# Patient Record
Sex: MALE | Race: BLACK OR AFRICAN AMERICAN | NOT HISPANIC OR LATINO | URBAN - METROPOLITAN AREA
[De-identification: names, ages, dates, MRNs, and addresses within clinical notes are randomized per-mention and may not be internally consistent; named-entity substitution may affect disease eponyms.]

---

## 2022-06-01 ENCOUNTER — EMERGENCY (EMERGENCY)
Facility: HOSPITAL | Age: 32
LOS: 0 days | Discharge: HOME | End: 2022-06-01
Attending: STUDENT IN AN ORGANIZED HEALTH CARE EDUCATION/TRAINING PROGRAM
Payer: MEDICAID

## 2022-06-01 VITALS
HEIGHT: 70 IN | OXYGEN SATURATION: 98 % | SYSTOLIC BLOOD PRESSURE: 125 MMHG | DIASTOLIC BLOOD PRESSURE: 80 MMHG | WEIGHT: 145.06 LBS | TEMPERATURE: 98 F | RESPIRATION RATE: 19 BRPM | HEART RATE: 74 BPM

## 2022-06-01 VITALS
OXYGEN SATURATION: 98 % | TEMPERATURE: 98 F | RESPIRATION RATE: 18 BRPM | SYSTOLIC BLOOD PRESSURE: 120 MMHG | DIASTOLIC BLOOD PRESSURE: 84 MMHG | HEART RATE: 80 BPM

## 2022-06-01 DIAGNOSIS — K64.8 OTHER HEMORRHOIDS: ICD-10-CM

## 2022-06-01 DIAGNOSIS — K92.1 MELENA: ICD-10-CM

## 2022-06-01 PROCEDURE — 99282 EMERGENCY DEPT VISIT SF MDM: CPT

## 2022-06-01 NOTE — ED PROVIDER NOTE - CLINICAL SUMMARY MEDICAL DECISION MAKING FREE TEXT BOX
33 yo M presenting with rectal bleed, abd benign, no external hemorrhoids/fissures/thrombosed hemorrhoids, dc with colorectal follow up and return recs

## 2022-06-01 NOTE — ED ADULT NURSE NOTE - OBJECTIVE STATEMENT
patient is A&Ox4. Breathing unlabored on RA. Girlfriend at bedside. Patient complaining of seeing blood in toilet bowl and wiping since Monday. Denies any SOB, abdominal pain, n/v/d, constipation, dysuria, fever, chills, history of hemorrhoids or fissures. denies any other symptoms at this time. denies any PMH. patient is A&Ox4. Breathing unlabored on RA. Girlfriend at bedside. Patient complaining of seeing blood in toilet bowl and wiping since Monday. States he works out in the gym occasionally. Denies any SOB, abdominal pain, n/v/d, constipation, dysuria, fever, chills, history of hemorrhoids or fissures. denies any pain. denies any other symptoms at this time. denies any PMH.

## 2022-06-01 NOTE — ED PROVIDER NOTE - PATIENT PORTAL LINK FT
You can access the FollowMyHealth Patient Portal offered by [facility] by registering at the following website: [email or web address]. By joining [employer]’s FollowMyHealth portal, you will also be able to view your health information using other applications (apps) compatible with our system.

## 2022-06-01 NOTE — ED PROVIDER NOTE - NSFOLLOWUPINSTRUCTIONS_ED_ALL_ED_FT
Hemorrhoids       Hemorrhoids are swollen veins that may develop:  •In the butt (rectum). These are called internal hemorrhoids.      •Around the opening of the butt (anus). These are called external hemorrhoids.      Hemorrhoids can cause pain, itching, or bleeding. Most of the time, they do not cause serious problems. They usually get better with diet changes, lifestyle changes, and other home treatments.      What are the causes?    This condition may be caused by:  •Having trouble pooping (constipation).      •Pushing hard (straining) to poop.      •Watery poop (diarrhea).      •Pregnancy.       •Being very overweight (obese).      •Sitting for long periods of time.       •Heavy lifting or other activity that causes you to strain.       •Anal sex.       •Riding a bike for a long period of time.        What are the signs or symptoms?    Symptoms of this condition include:  •Pain.       •Itching or soreness in the butt.      •Bleeding from the butt.      •Leaking poop.      •Swelling in the area.      •One or more lumps around the opening of your butt.        How is this diagnosed?    A doctor can often diagnose this condition by looking at the affected area. The doctor may also:  •Do an exam that involves feeling the area with a gloved hand (digital rectal exam).      •Examine the area inside your butt using a small tube (anoscope).      •Order blood tests. This may be done if you have lost a lot of blood.      •Have you get a test that involves looking inside the colon using a flexible tube with a camera on the end (sigmoidoscopy or colonoscopy).        How is this treated?    This condition can usually be treated at home. Your doctor may tell you to change what you eat, make lifestyle changes, or try home treatments. If these do not help, procedures can be done to remove the hemorrhoids or make them smaller. These may involve:  •Placing rubber bands at the base of the hemorrhoids to cut off their blood supply.      •Injecting medicine into the hemorrhoids to shrink them.      •Shining a type of light energy onto the hemorrhoids to cause them to fall off.      •Doing surgery to remove the hemorrhoids or cut off their blood supply.        Follow these instructions at home:      Eating and drinking      •Eat foods that have a lot of fiber in them. These include whole grains, beans, nuts, fruits, and vegetables.      •Ask your doctor about taking products that have added fiber (fibersupplements).    •Reduce the amount of fat in your diet. You can do this by:  •Eating low-fat dairy products.      •Eating less red meat.      •Avoiding processed foods.        •Drink enough fluid to keep your pee (urine) pale yellow.        Managing pain and swelling      •Take a warm-water bath (sitz bath) for 20 minutes to ease pain. Do this 3–4 times a day. You may do this in a bathtub or using a portable sitz bath that fits over the toilet.    •If told, put ice on the painful area. It may be helpful to use ice between your warm baths.  •Put ice in a plastic bag.      •Place a towel between your skin and the bag.      •Leave the ice on for 20 minutes, 2–3 times a day.        General instructions   •Take over-the-counter and prescription medicines only as told by your doctor.  •Medicated creams and medicines may be used as told.        •Exercise often. Ask your doctor how much and what kind of exercise is best for you.      •Go to the bathroom when you have the urge to poop. Do not wait.      •Avoid pushing too hard when you poop.      •Keep your butt dry and clean. Use wet toilet paper or moist towelettes after pooping.      • Do not sit on the toilet for a long time.      •Keep all follow-up visits as told by your doctor. This is important.        Contact a doctor if you:    •Have pain and swelling that do not get better with treatment or medicine.      •Have trouble pooping.      •Cannot poop.      •Have pain or swelling outside the area of the hemorrhoids.        Get help right away if you have:    •Bleeding that will not stop.        Summary    •Hemorrhoids are swollen veins in the butt or around the opening of the butt.      •They can cause pain, itching, or bleeding.      •Eat foods that have a lot of fiber in them. These include whole grains, beans, nuts, fruits, and vegetables.      •Take a warm-water bath (sitz bath) for 20 minutes to ease pain. Do this 3–4 times a day.      This information is not intended to replace advice given to you by your health care provider. Make sure you discuss any questions you have with your health care provider.

## 2022-06-01 NOTE — ED PROVIDER NOTE - NS ED ROS FT
CONST: no fevers, no chills, no trauma  EYES: no pain, no visual disturbances  ENT: no sore throat, no epistaxis, no rhinorrhea, no hearing changes  CV: no chest pain, no palpitations, no orthopnea, no extremity pain or swelling  RESP: no shortness of breath, no cough, no sputum, no pleurisy, no wheezing  ABD: no abdominal pain, no nausea, no vomiting, no diarrhea, no black stool, + bloody stool  : no dysuria, no hematuria, no frequency, no urgency  MSK: no back pain, no neck pain, no extremity pain  NEURO: no headache, no sensory disturbances, no focal weakness, no dizziness  HEME: no easy bleeding or bruising  SKIN: no diaphoresis, no rash

## 2022-06-01 NOTE — ED PROVIDER NOTE - PHYSICAL EXAMINATION
VITALS: reviewed  GEN: NAD, A & O x 4  HEAD/EYES: NCAT, PERRL, EOMI, anicteric sclerae, no conjunctival pallor  ENT: mucus membranes moist, oropharynx WNL, trachea midline, no JVD  RESP: lungs CTA with equal breath sounds bilaterally, chest wall nontender and atraumatic  CV: heart with reg rhythm S1, S2, no murmur; distal pulses intact and symmetric bilaterally  ABDOMEN: normoactive bowel sounds, soft, nondistended, nontender, no palpable masses, no external hemorrhoids or fissures ([facility] ED tech Smaill)   : no CVAT,  MSK: extremities atraumatic and nontender, no edema, no asymmetry. the back is without midline or lateral tenderness, there is no spinal deformity or stepoff and the back is ranged painlessly. the neck has no midline tenderness, deformity, or stepoff, and is ranged painlessly.  SKIN: warm, dry, no rash, no bruising, no cyanosis. color appropriate for ethnicity  NEURO: alert, mentating appropriately, no facial asymmetry. gross sensation, motor, coordination are intact  PSYCH: Affect appropriate

## 2022-06-01 NOTE — ED PROVIDER NOTE - OBJECTIVE STATEMENT
33 yo M no pmhx presenting with complaints of painless, bloody stools x 3 days. Blood seen in toilet and when wiping. No abodminal pain. No black stools. No fevers/chills. Reports straining movements when at the gym. Denies constipation.

## 2024-05-08 ENCOUNTER — EMERGENCY (EMERGENCY)
Facility: HOSPITAL | Age: 34
LOS: 0 days | Discharge: HOME | End: 2024-05-09
Attending: STUDENT IN AN ORGANIZED HEALTH CARE EDUCATION/TRAINING PROGRAM
Payer: SELF-PAY

## 2024-05-08 VITALS
RESPIRATION RATE: 16 BRPM | HEIGHT: 71 IN | OXYGEN SATURATION: 97 % | WEIGHT: 149.91 LBS | TEMPERATURE: 99 F | HEART RATE: 90 BPM | SYSTOLIC BLOOD PRESSURE: 125 MMHG | DIASTOLIC BLOOD PRESSURE: 76 MMHG

## 2024-05-08 DIAGNOSIS — M79.602 PAIN IN LEFT ARM: ICD-10-CM

## 2024-05-08 PROCEDURE — 99284 EMERGENCY DEPT VISIT MOD MDM: CPT | Mod: 25

## 2024-05-08 PROCEDURE — 99053 MED SERV 10PM-8AM 24 HR FAC: CPT

## 2024-05-08 PROCEDURE — 29125 APPL SHORT ARM SPLINT STATIC: CPT | Mod: LT
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
## 2024-05-09 PROBLEM — Z78.9 OTHER SPECIFIED HEALTH STATUS: Chronic | Status: ACTIVE | Noted: 2022-06-01

## 2024-05-09 PROCEDURE — 73060 X-RAY EXAM OF HUMERUS: CPT | Mod: 26,LT

## 2024-05-09 PROCEDURE — 73090 X-RAY EXAM OF FOREARM: CPT | Mod: 26,LT

## 2024-05-09 PROCEDURE — 73080 X-RAY EXAM OF ELBOW: CPT | Mod: 26,LT

## 2024-05-09 RX ORDER — OXYCODONE HYDROCHLORIDE 5 MG/1
5 TABLET ORAL ONCE
Refills: 0 | Status: DISCONTINUED | OUTPATIENT
Start: 2024-05-09 | End: 2024-05-09

## 2024-05-09 RX ADMIN — OXYCODONE HYDROCHLORIDE 5 MILLIGRAM(S): 5 TABLET ORAL at 01:01
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
## 2024-05-09 NOTE — ED PROVIDER NOTE - PROGRESS NOTE DETAILS
Patient received on sign-out from Dr. Guevara. Male presenting to the ED for pain over the L arm after cast placement. cast removed and sling placed. patient to be follow-up with orthopedics next week
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
## 2024-05-09 NOTE — ED PROVIDER NOTE - PATIENT PORTAL LINK FT
You can access the FollowMyHealth Patient Portal offered by [facility] by registering at the following website: [email or web address]. By joining [employer]’s FollowMyHealth portal, you will also be able to view your health information using other applications (apps) compatible with our system.

## 2024-05-09 NOTE — ED PROVIDER NOTE - PHYSICAL EXAMINATION
General: Well appearing male in no acute distress  HEENT: Normocephalic, atraumatic. Moist mucous membranes. Oropharynx clear. No lymphadenopathy.  Eyes: No scleral icterus. EOMI. BANDAR.  Neck:. Soft and supple. Full ROM without pain. No midline tenderness  Cardiac: Regular rate and regular rhythm. No murmurs, rubs, gallops. Peripheral pulses 2+ and symmetric. No LE edema.  Resp: Lungs CTAB. Speaking in full sentences. No wheezes, rales or rhonchi.  Abd: Soft, non-tender, non-distended. No guarding or rebound. No scars, masses, or lesions.  Back: Spine midline and non-tender. No CVA tenderness.    Skin: No rashes, abrasions, or lacerations.  Neuro: AO x 3. Moves all extremities symmetrically. Motor strength and sensation grossly intact.  MSK: splint over L forearm on arrival, radial pulse 2+, able to move fingers, compartments of LUE soft after splint removal for assessment.

## 2024-05-09 NOTE — ED PROVIDER NOTE - CARE PROVIDER_API CALL
Chino Viera  Orthopaedic Surgery  [address]  [place] [address]  Phone: [phone]  Fax: [phone]  Follow Up Time: 4-6 Days

## 2024-05-09 NOTE — ED ADULT NURSE NOTE - NSFALLUNIVINTERV_ED_ALL_ED
Bed/Stretcher in lowest position, wheels locked, appropriate side rails in place/Call bell, personal items and telephone in reach/Instruct patient to call for assistance before getting out of bed/chair/stretcher/Non-slip footwear applied when patient is off stretcher/[place] to call system/Physically safe environment - no spills, clutter or unnecessary equipment/Purposeful proactive rounding/Room/bathroom lighting operational, light cord in reach
